# Patient Record
(demographics unavailable — no encounter records)

---

## 2025-01-08 NOTE — PAST MEDICAL HISTORY
[At Term] : at term [ Section] : by  section [None] : there were no delivery complications [Age Appropriate] : age appropriate developmental milestones met [FreeTextEntry5] : None

## 2025-01-08 NOTE — PHYSICAL EXAM
[Healthy Appearing] : healthy appearing [Well Nourished] : well nourished [Interactive] : interactive [Normal] : normal [Normal Appearance] : normal appearance [Well formed] : well formed [Normally Set] : normally set [None] : there were no thyroid nodules [Normal S1 and S2] : normal S1 and S2 [Clear to Ausculation Bilaterally] : clear to auscultation bilaterally [Abdomen Soft] : soft [Abdomen Tenderness] : non-tender [] : no hepatosplenomegaly [Frankie Stage ___] : the Frankie stage for breast development was [unfilled] [Acanthosis Nigricans___] : no acanthosis nigricans [Pale Striae on Flanks] : no pale striae on flanks [Hirsutism] : no hirsutism [Goiter] : no goiter [Murmur] : no murmurs [de-identified] : +B/L flushed cheeks w/ overlying papules  [de-identified] : no axillary hair, patient declined genital exam

## 2025-01-08 NOTE — FAMILY HISTORY
[___ inches] : [unfilled] inches [FreeTextEntry5] : 11yo  [FreeTextEntry2] : Brother is 6yo, tall for age (is maybe 2in shorter than patient)

## 2025-01-08 NOTE — PHYSICAL EXAM
[Healthy Appearing] : healthy appearing [Well Nourished] : well nourished [Interactive] : interactive [Normal] : normal [Normal Appearance] : normal appearance [Well formed] : well formed [Normally Set] : normally set [None] : there were no thyroid nodules [Normal S1 and S2] : normal S1 and S2 [Clear to Ausculation Bilaterally] : clear to auscultation bilaterally [Abdomen Soft] : soft [Abdomen Tenderness] : non-tender [] : no hepatosplenomegaly [Frankie Stage ___] : the Frankie stage for breast development was [unfilled] [Acanthosis Nigricans___] : no acanthosis nigricans [Pale Striae on Flanks] : no pale striae on flanks [Hirsutism] : no hirsutism [Goiter] : no goiter [Murmur] : no murmurs [de-identified] : +B/L flushed cheeks w/ overlying papules  [de-identified] : no axillary hair, patient declined genital exam

## 2025-01-08 NOTE — HISTORY OF PRESENT ILLNESS
[Headaches] : headaches [Polydipsia] : polydipsia [Personality Changes] : ~T personality changes [Palpitations] : palpitations [Heat Intolerance] : heat intolerance [Premenarchal] : premenarchal [Nervousness] : nervousness [Visual Symptoms] : no ~T visual symptoms [Polyuria] : no polyuria [Knee Pain] : no knee pain [Hip Pain] : no hip pain [Constipation] : no constipation [Cold Intolerance] : no cold intolerance [Sweating] : no sweating [Muscle Weakness] : no muscle weakness [Increased Appetite] : no increased appetite  [Fatigue] : no fatigue [Weakness] : no weakness [Anorexia] : no anorexia [Abdominal Pain] : no abdominal pain [Weight Loss] : no weight loss [Nausea] : no nausea [Vomiting] : no vomiting [Change in Skin Pigmentation] : no change in skin pigmentation [FreeTextEntry2] : Margie is an 8 year 6 month old girl,with syrinx and parental report of PANDAS, 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 Margie sustained a head injury in 8/2024 where she had a MRI done showing a thoracic syrinx.  Margie had repeat imaging in 12/2024 with sedation.   Repeat imaging showed: partially imaged nonspecific T2 hypointensity is noted within the sella turcica in the region of the pars intermedia. Hemorrhage within a pars intermedia cyst is the primary consideration.  Margie's mother reports that she has had stable growth and has not yet noted signs of puberty.   Margie has normal  energy levels.  Margie has heat intolerance and intermittent constipation.  Margie drinks 68 oz water/day.  She has not had polyuria or nocturnal enuresis, but wakes up twice a night to use restroom, although she has been having nightmares so unclear if waking for restroom specifically. For the past 2-3 day, Margie has been very thirsty overnight and won't go back to sleep unless she has water; in the past this happened only sporadically, but has been a bit more consistent recently.  Margie has been having HAs for past 2 months, more often at night and sometimes during the day, sometimes feels dizzy. She has more hyperactivity since the anesthesia in 12/2024, which mother attributes to MTHFR being affected by Propofol--patient can't sleep through the night and has nightmares that wake her. No visual complaints, but did complain about blurry vision a few months ago, which mom treated with reader glasses from [employer] and [employer], and it resolved.

## 2025-01-08 NOTE — HISTORY OF PRESENT ILLNESS
[Headaches] : headaches [Visual Symptoms] : no ~T visual symptoms [Polyuria] : no polyuria [Polydipsia] : polydipsia [Knee Pain] : no knee pain [Hip Pain] : no hip pain [Personality Changes] : ~T personality changes [Constipation] : no constipation [Cold Intolerance] : no cold intolerance [Sweating] : no sweating [Palpitations] : palpitations [Nervousness] : nervousness [Muscle Weakness] : no muscle weakness [Increased Appetite] : no increased appetite  [Heat Intolerance] : heat intolerance [Fatigue] : no fatigue [Weakness] : no weakness [Anorexia] : no anorexia [Abdominal Pain] : no abdominal pain [Weight Loss] : no weight loss [Nausea] : no nausea [Vomiting] : no vomiting [Change in Skin Pigmentation] : no change in skin pigmentation [FreeTextEntry2] : Margie is an 8 year 6 month old girl,with syrinx and parental report of PANDAS, 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 Margie sustained a head injury in 8/2024 where she had a MRI done showing a thoracic syrinx.  Margie had repeat imaging in 12/2024 with sedation.   Repeat imaging showed: partially imaged nonspecific T2 hypointensity is noted within the sella turcica in the region of the pars intermedia. Hemorrhage within a pars intermedia cyst is the primary consideration.  Margie's mother reports that she has had stable growth and has not yet noted signs of puberty.   Margie has normal  energy levels.  Margie has heat intolerance and intermittent constipation.  Margie drinks 68 oz water/day.  She has not had polyuria or nocturnal enuresis, but wakes up twice a night to use restroom, although she has been having nightmares so unclear if waking for restroom specifically. For the past 2-3 day, Margie has been very thirsty overnight and won't go back to sleep unless she has water; in the past this happened only sporadically, but has been a bit more consistent recently.  Margie has been having HAs for past 2 months, more often at night and sometimes during the day, sometimes feels dizzy. She has more hyperactivity since the anesthesia in 12/2024, which mother attributes to MTHFR being affected by Propofol--patient can't sleep through the night and has nightmares that wake her. No visual complaints, but did complain about blurry vision a few months ago, which mom treated with reader glasses from [employer] and [employer], and it resolved.    [Premenarchal] : premenarchal

## 2025-01-08 NOTE — FAMILY HISTORY
[___ inches] : [unfilled] inches [FreeTextEntry5] : 11yo  [FreeTextEntry2] : Brother is 8yo, tall for age (is maybe 2in shorter than patient)

## 2025-01-08 NOTE — PHYSICAL EXAM
[Healthy Appearing] : healthy appearing [Well Nourished] : well nourished [Interactive] : interactive [Normal] : normal [Normal Appearance] : normal appearance [Well formed] : well formed [Normally Set] : normally set [None] : there were no thyroid nodules [Normal S1 and S2] : normal S1 and S2 [Clear to Ausculation Bilaterally] : clear to auscultation bilaterally [Abdomen Soft] : soft [Abdomen Tenderness] : non-tender [] : no hepatosplenomegaly [Frankie Stage ___] : the Frankie stage for breast development was [unfilled] [Acanthosis Nigricans___] : no acanthosis nigricans [Pale Striae on Flanks] : no pale striae on flanks [Hirsutism] : no hirsutism [Goiter] : no goiter [Murmur] : no murmurs [de-identified] : +B/L flushed cheeks w/ overlying papules  [de-identified] : no axillary hair, patient declined genital exam

## 2025-01-08 NOTE — CONSULT LETTER
[Dear  ___] : Dear  [unfilled], [Consult Letter:] : I had the pleasure of evaluating your patient, [unfilled]. [Consult Closing:] : Thank you very much for allowing me to participate in the care of this patient.  If you have any questions, please do not hesitate to contact me. [Sincerely,] : Sincerely, [FreeTextEntry2] : Cheyenne Gilbert MD [FreeTextEntry3] : Belinda Harrison MD

## 2025-01-08 NOTE — HISTORY OF PRESENT ILLNESS
[Headaches] : headaches [Polydipsia] : polydipsia [Personality Changes] : ~T personality changes [Palpitations] : palpitations [Heat Intolerance] : heat intolerance [Premenarchal] : premenarchal [Nervousness] : nervousness [Visual Symptoms] : no ~T visual symptoms [Polyuria] : no polyuria [Knee Pain] : no knee pain [Hip Pain] : no hip pain [Constipation] : no constipation [Cold Intolerance] : no cold intolerance [Sweating] : no sweating [Muscle Weakness] : no muscle weakness [Increased Appetite] : no increased appetite  [Fatigue] : no fatigue [Weakness] : no weakness [Anorexia] : no anorexia [Abdominal Pain] : no abdominal pain [Weight Loss] : no weight loss [Nausea] : no nausea [Vomiting] : no vomiting [Change in Skin Pigmentation] : no change in skin pigmentation [FreeTextEntry2] : Margie is an 8 year 6 month old girl,with syrinx and parental report of PANDAS, 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 Margie sustained a head injury in 8/2024 where she had a MRI done showing a thoracic syrinx.  Margie had repeat imaging in 12/2024 with sedation.   Repeat imaging showed: partially imaged nonspecific T2 hypointensity is noted within the sella turcica in the region of the pars intermedia. Hemorrhage within a pars intermedia cyst is the primary consideration.  Margie's mother reports that she has had stable growth and has not yet noted signs of puberty.   Margie has normal  energy levels.  Margie has heat intolerance and intermittent constipation.  Margie drinks 68 oz water/day.  She has not had polyuria or nocturnal enuresis, but wakes up twice a night to use restroom, although she has been having nightmares so unclear if waking for restroom specifically. For the past 2-3 day, Margie has been very thirsty overnight and won't go back to sleep unless she has water; in the past this happened only sporadically, but has been a bit more consistent recently.  Margie has been having HAs for past 2 months, more often at night and sometimes during the day, sometimes feels dizzy. She has more hyperactivity since the anesthesia in 12/2024, which mother attributes to MTHFR being affected by Propofol--patient can't sleep through the night and has nightmares that wake her. No visual complaints, but did complain about blurry vision a few months ago, which mom treated with reader glasses from [employer] and [employer], and it resolved.

## 2025-01-08 NOTE — HISTORY OF PRESENT ILLNESS
[Headaches] : headaches [Polydipsia] : polydipsia [Personality Changes] : ~T personality changes [Palpitations] : palpitations [Heat Intolerance] : heat intolerance [Premenarchal] : premenarchal [Nervousness] : nervousness [Visual Symptoms] : no ~T visual symptoms [Polyuria] : no polyuria [Knee Pain] : no knee pain [Hip Pain] : no hip pain [Constipation] : no constipation [Cold Intolerance] : no cold intolerance [Sweating] : no sweating [Muscle Weakness] : no muscle weakness [Increased Appetite] : no increased appetite  [Fatigue] : no fatigue [Weakness] : no weakness [Anorexia] : no anorexia [Abdominal Pain] : no abdominal pain [Weight Loss] : no weight loss [Nausea] : no nausea [Vomiting] : no vomiting [Change in Skin Pigmentation] : no change in skin pigmentation [FreeTextEntry2] : Margie is an 8 year 6 month old girl,with syrinx and parental report of PANDAS, 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 Margie sustained a head injury in 8/2024 where she had a MRI done showing a thoracic syrinx.  Margie had repeat imaging in 12/2024 with sedation.   Repeat imaging showed: partially imaged nonspecific T2 hypointensity is noted within the sella turcica in the region of the pars intermedia. Hemorrhage within a pars intermedia cyst is the primary consideration.  Margie's mother reports that she has had stable growth and has not yet noted signs of puberty.   Margie has normal  energy levels.  Margie has heat intolerance and intermittent constipation.  Margie drinks 68 oz water/day.  She has not had polyuria or nocturnal enuresis, but wakes up twice a night to use restroom, although she has been having nightmares so unclear if waking for restroom specifically. For the past 2-3 day, Margie has been very thirsty overnight and won't go back to sleep unless she has water; in the past this happened only sporadically, but has been a bit more consistent recently.  Margie has been having HAs for past 2 months, more often at night and sometimes during the day, sometimes feels dizzy. She has more hyperactivity since the anesthesia in 12/2024, which mother attributes to MTHFR being affected by Propofol--patient can't sleep through the night and has nightmares that wake her. No visual complaints, but did complain about blurry vision a few months ago, which mom treated with reader glasses from [email or web address] and [employer] and it resolved.

## 2025-01-08 NOTE — REASON FOR VISIT
[Consultation] : a consultation visit [Patient] : patient [Mother] : mother [Medical Records] : medical records [FreeTextEntry1] : sella turcica mass

## 2025-01-08 NOTE — PHYSICAL EXAM
[Healthy Appearing] : healthy appearing [Well Nourished] : well nourished [Interactive] : interactive [Acanthosis Nigricans___] : no acanthosis nigricans [Pale Striae on Flanks] : no pale striae on flanks [Hirsutism] : no hirsutism [Normal] : normal [Normal Appearance] : normal appearance [Well formed] : well formed [Normally Set] : normally set [Goiter] : no goiter [None] : there were no thyroid nodules [Normal S1 and S2] : normal S1 and S2 [Murmur] : no murmurs [Clear to Ausculation Bilaterally] : clear to auscultation bilaterally [Abdomen Soft] : soft [Abdomen Tenderness] : non-tender [] : no hepatosplenomegaly [Frankie Stage ___] : the Frankie stage for breast development was [unfilled] [de-identified] : no axillary hair, patient declined genital exam  [de-identified] : +B/L flushed cheeks w/ overlying papules

## 2025-01-08 NOTE — DISCUSSION/SUMMARY
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  She has nonspecific symptoms of heat intolerance, (?) polydipsia/polyuria, and intermittent constipation.   We discussed normal pituitary function and reviewed symptoms of pituitary hormone dysfunction.  To further evaluate, pituitary screening fasting AM labs were ordered including- prolactin, cortisol, IGF1/BP3, TSH, free T4, serum osmolality, BMP.    As labs were ordered fasting and history of hypercholesterolemia- fasting lipid profile also ordered. She has normal height for family potential.   Additionally, if patient has MRI, requested she have dedicated pituitary imaging to better characterize findings.  Follow up to be determined pending above.

## 2025-02-28 NOTE — HISTORY OF PRESENT ILLNESS
[FreeTextEntry1] : 2/28/2025  with her mother. Hx of head/back trauma last summer. Mother and child reported occasion pain over the eyes and mid back. Responds to ibuprophen First MRI:  8/6/2024  Fast MRI: No hydrocephalus  Serial spine MRIs  showed syrinx in the lower thoracic spinal cord stable in size when compared to 9/8/2024 and 8/16/2024 exams. No obvious cerebellar downward displacement of the cerebellar tonsils noted (12/13/24). Dr. Mitchell who follows the child ordered a repeat MRI brain and spine with flow study in 3 months.  Child could not be specific on the frequency of the headaches. No nausea or vomiting was reported with the headaches.  Not missing school.

## 2025-02-28 NOTE — ASSESSMENT
[FreeTextEntry1] : Syrinx as described not sure if related to the trauma. Normal exam today  Being closely F/U with Dr. Mitchell

## 2025-02-28 NOTE — PHYSICAL EXAM
[Well-appearing] : well-appearing [Normocephalic] : normocephalic [No dysmorphic facial features] : no dysmorphic facial features [Straight] : straight [Alert] : alert [Well related, good eye contact] : well related, good eye contact [Conversant] : conversant [Normal speech and language] : normal speech and language [Follows instructions well] : follows instructions well [VFF] : VFF [Pupils reactive to light and accommodation] : pupils reactive to light and accommodation [Full extraocular movements] : full extraocular movements [No nystagmus] : no nystagmus [Normal facial sensation to light touch] : normal facial sensation to light touch [No facial asymmetry or weakness] : no facial asymmetry or weakness [Equal palate elevation] : equal palate elevation [Good shoulder shrug] : good shoulder shrug [Normal tongue movement] : normal tongue movement [Normal axial and appendicular muscle tone] : normal axial and appendicular muscle tone [No abnormal involuntary movements] : no abnormal involuntary movements [Able to walk on heels] : able to walk on heels [Able to walk on toes] : able to walk on toes [Knee jerks] : knee jerks [Ankle jerks] : ankle jerks [No ankle clonus] : no ankle clonus [Bilaterally] : bilaterally [No dysmetria on FTNT] : no dysmetria on FTNT [Good walking balance] : good walking balance [Normal gait] : normal gait [Able to tandem well] : able to tandem well [de-identified] : No back pain on palpation  [de-identified] : Could not see the fundi -

## 2025-05-08 NOTE — PHYSICAL EXAM
[Well-appearing] : well-appearing [Normocephalic] : normocephalic [No dysmorphic facial features] : no dysmorphic facial features [Straight] : straight [Alert] : alert [Well related, good eye contact] : well related, good eye contact [Conversant] : conversant [Normal speech and language] : normal speech and language [Follows instructions well] : follows instructions well [VFF] : VFF [Pupils reactive to light and accommodation] : pupils reactive to light and accommodation [Full extraocular movements] : full extraocular movements [No nystagmus] : no nystagmus [Normal facial sensation to light touch] : normal facial sensation to light touch [No facial asymmetry or weakness] : no facial asymmetry or weakness [Equal palate elevation] : equal palate elevation [Good shoulder shrug] : good shoulder shrug [Normal tongue movement] : normal tongue movement [Normal axial and appendicular muscle tone] : normal axial and appendicular muscle tone [No abnormal involuntary movements] : no abnormal involuntary movements [Able to walk on heels] : able to walk on heels [Able to walk on toes] : able to walk on toes [Knee jerks] : knee jerks [Ankle jerks] : ankle jerks [No ankle clonus] : no ankle clonus [Bilaterally] : bilaterally [No dysmetria on FTNT] : no dysmetria on FTNT [Good walking balance] : good walking balance [Normal gait] : normal gait [Able to tandem well] : able to tandem well [No papilledema] : no papilledema [de-identified] : No back pain on palpation  [de-identified] : -

## 2025-05-08 NOTE — HISTORY OF PRESENT ILLNESS
[FreeTextEntry1] : 2/28/2025  with her mother. Hx of head/back trauma last summer. Mother and child reported occasion pain over the eyes and mid back. Responds to ibuprophen First MRI:  8/6/2024  Fast MRI: No hydrocephalus  Serial spine MRIs  showed syrinx in the lower thoracic spinal cord stable in size when compared to 9/8/2024 and 8/16/2024 exams. No obvious cerebellar downward displacement of the cerebellar tonsils noted (12/13/24). Dr. Mitchell who follows the child ordered a repeat MRI brain and spine with flow study in 3 months.  Child could not be specific on the frequency of the headaches. No nausea or vomiting was reported with the headaches.  Not missing school.   5/8/2025  with the [facility]. Reported that since the last visit Child had two "slip ups" and very infrequently reported feelings of pins and needles at the feet.

## 2025-05-08 NOTE — ASSESSMENT
[FreeTextEntry1] : Syrinx as described not sure if related to the trauma. Normal exam today  Being closely F/U with Dr. Mitchell  Repeat brain. Spine MRI scheduled for next week.

## 2025-06-19 NOTE — ASSESSMENT
[FreeTextEntry1] : Syrinx as described not sure if related to the trauma. Normal exam today  Being closely F/U with Dr. Mitchell  Repeat brain. Spine MRI scheduled in 6 weeks.

## 2025-06-19 NOTE — HISTORY OF PRESENT ILLNESS
[FreeTextEntry1] : 2/28/2025  with her mother. Hx of head/back trauma last summer. Mother and child reported occasion pain over the eyes and mid back. Responds to ibuprophen First MRI:  8/6/2024  Fast MRI: No hydrocephalus  Serial spine MRIs  showed syrinx in the lower thoracic spinal cord stable in size when compared to 9/8/2024 and 8/16/2024 exams. No obvious cerebellar downward displacement of the cerebellar tonsils noted (12/13/24). Dr. Mitchell who follows the child ordered a repeat MRI brain and spine with flow study in 3 months.  Child could not be specific on the frequency of the headaches. No nausea or vomiting was reported with the headaches.  Not missing school.   5/8/2025  with the [facility]. Reported that since the last visit Child had two "slip ups" and very infrequently reported feelings of pins and needles at the feet.    6/19/2025  with the mother. Falling a fall in the trampoline and when she slipped in the mud.  Child reported mid back pain. 6/9/25   MRI head and spine - was stable.

## 2025-06-19 NOTE — PHYSICAL EXAM
[Well-appearing] : well-appearing [Normocephalic] : normocephalic [No dysmorphic facial features] : no dysmorphic facial features [Straight] : straight [Alert] : alert [Well related, good eye contact] : well related, good eye contact [Conversant] : conversant [Normal speech and language] : normal speech and language [Follows instructions well] : follows instructions well [VFF] : VFF [Pupils reactive to light and accommodation] : pupils reactive to light and accommodation [Full extraocular movements] : full extraocular movements [No nystagmus] : no nystagmus [No papilledema] : no papilledema [Normal facial sensation to light touch] : normal facial sensation to light touch [No facial asymmetry or weakness] : no facial asymmetry or weakness [Equal palate elevation] : equal palate elevation [Good shoulder shrug] : good shoulder shrug [Normal tongue movement] : normal tongue movement [Normal axial and appendicular muscle tone] : normal axial and appendicular muscle tone [No abnormal involuntary movements] : no abnormal involuntary movements [Able to walk on heels] : able to walk on heels [Able to walk on toes] : able to walk on toes [Knee jerks] : knee jerks [Ankle jerks] : ankle jerks [No ankle clonus] : no ankle clonus [Bilaterally] : bilaterally [No dysmetria on FTNT] : no dysmetria on FTNT [Good walking balance] : good walking balance [Normal gait] : normal gait [Able to tandem well] : able to tandem well [de-identified] : Mid back pain on palpation  [de-identified] : -